# Patient Record
Sex: FEMALE | Race: WHITE | NOT HISPANIC OR LATINO
[De-identification: names, ages, dates, MRNs, and addresses within clinical notes are randomized per-mention and may not be internally consistent; named-entity substitution may affect disease eponyms.]

---

## 2017-02-28 ENCOUNTER — RESULT REVIEW (OUTPATIENT)
Age: 52
End: 2017-02-28

## 2021-05-20 ENCOUNTER — RESULT REVIEW (OUTPATIENT)
Age: 56
End: 2021-05-20

## 2023-07-10 ENCOUNTER — OUTPATIENT (OUTPATIENT)
Dept: OUTPATIENT SERVICES | Facility: HOSPITAL | Age: 58
LOS: 1 days | End: 2023-07-10

## 2023-07-10 VITALS
OXYGEN SATURATION: 98 % | WEIGHT: 113.98 LBS | RESPIRATION RATE: 16 BRPM | HEIGHT: 63.5 IN | DIASTOLIC BLOOD PRESSURE: 94 MMHG | HEART RATE: 65 BPM | TEMPERATURE: 98 F | SYSTOLIC BLOOD PRESSURE: 169 MMHG

## 2023-07-10 DIAGNOSIS — Z98.890 OTHER SPECIFIED POSTPROCEDURAL STATES: Chronic | ICD-10-CM

## 2023-07-10 DIAGNOSIS — N84.0 POLYP OF CORPUS UTERI: ICD-10-CM

## 2023-07-10 LAB
ANION GAP SERPL CALC-SCNC: 14 MMOL/L — SIGNIFICANT CHANGE UP (ref 7–14)
BUN SERPL-MCNC: 12 MG/DL — SIGNIFICANT CHANGE UP (ref 7–23)
CALCIUM SERPL-MCNC: 9.9 MG/DL — SIGNIFICANT CHANGE UP (ref 8.4–10.5)
CHLORIDE SERPL-SCNC: 102 MMOL/L — SIGNIFICANT CHANGE UP (ref 98–107)
CO2 SERPL-SCNC: 25 MMOL/L — SIGNIFICANT CHANGE UP (ref 22–31)
CREAT SERPL-MCNC: 0.71 MG/DL — SIGNIFICANT CHANGE UP (ref 0.5–1.3)
EGFR: 98 ML/MIN/1.73M2 — SIGNIFICANT CHANGE UP
GLUCOSE SERPL-MCNC: 86 MG/DL — SIGNIFICANT CHANGE UP (ref 70–99)
HCT VFR BLD CALC: 41.5 % — SIGNIFICANT CHANGE UP (ref 34.5–45)
HGB BLD-MCNC: 13.4 G/DL — SIGNIFICANT CHANGE UP (ref 11.5–15.5)
MCHC RBC-ENTMCNC: 31.1 PG — SIGNIFICANT CHANGE UP (ref 27–34)
MCHC RBC-ENTMCNC: 32.3 GM/DL — SIGNIFICANT CHANGE UP (ref 32–36)
MCV RBC AUTO: 96.3 FL — SIGNIFICANT CHANGE UP (ref 80–100)
NRBC # BLD: 0 /100 WBCS — SIGNIFICANT CHANGE UP (ref 0–0)
NRBC # FLD: 0 K/UL — SIGNIFICANT CHANGE UP (ref 0–0)
PLATELET # BLD AUTO: 260 K/UL — SIGNIFICANT CHANGE UP (ref 150–400)
POTASSIUM SERPL-MCNC: 4.6 MMOL/L — SIGNIFICANT CHANGE UP (ref 3.5–5.3)
POTASSIUM SERPL-SCNC: 4.6 MMOL/L — SIGNIFICANT CHANGE UP (ref 3.5–5.3)
RBC # BLD: 4.31 M/UL — SIGNIFICANT CHANGE UP (ref 3.8–5.2)
RBC # FLD: 13 % — SIGNIFICANT CHANGE UP (ref 10.3–14.5)
SODIUM SERPL-SCNC: 141 MMOL/L — SIGNIFICANT CHANGE UP (ref 135–145)
WBC # BLD: 4.11 K/UL — SIGNIFICANT CHANGE UP (ref 3.8–10.5)
WBC # FLD AUTO: 4.11 K/UL — SIGNIFICANT CHANGE UP (ref 3.8–10.5)

## 2023-07-10 RX ORDER — SODIUM CHLORIDE 9 MG/ML
1000 INJECTION, SOLUTION INTRAVENOUS
Refills: 0 | Status: DISCONTINUED | OUTPATIENT
Start: 2023-07-20 | End: 2023-08-03

## 2023-07-10 NOTE — H&P PST ADULT - CARDIOVASCULAR
Wrong provider, it was prescribed by Dr Khan   details… regular rate and rhythm/S1 S2 present/no gallops/no rub/no murmur

## 2023-07-10 NOTE — H&P PST ADULT - PROBLEM SELECTOR PLAN 1
This is a 57 y/o female who is scheduled for D&C, hysteroscopy with symphion on 7/20/23.  * Given preop instructions

## 2023-07-10 NOTE — H&P PST ADULT - HISTORY OF PRESENT ILLNESS
This is a 59 y/o female who presents with postmenopausal bleeding. Subsequent sonogram revealed a polyp. Scheduled for D&C, hysteroscopy with symphion.

## 2023-07-19 ENCOUNTER — TRANSCRIPTION ENCOUNTER (OUTPATIENT)
Age: 58
End: 2023-07-19

## 2023-07-20 ENCOUNTER — OUTPATIENT (OUTPATIENT)
Dept: OUTPATIENT SERVICES | Facility: HOSPITAL | Age: 58
LOS: 1 days | Discharge: HOME | End: 2023-07-20
Payer: COMMERCIAL

## 2023-07-20 ENCOUNTER — TRANSCRIPTION ENCOUNTER (OUTPATIENT)
Age: 58
End: 2023-07-20

## 2023-07-20 VITALS
OXYGEN SATURATION: 100 % | HEART RATE: 74 BPM | TEMPERATURE: 98 F | HEIGHT: 63.5 IN | DIASTOLIC BLOOD PRESSURE: 96 MMHG | RESPIRATION RATE: 14 BRPM | SYSTOLIC BLOOD PRESSURE: 160 MMHG | WEIGHT: 113.98 LBS

## 2023-07-20 VITALS
SYSTOLIC BLOOD PRESSURE: 135 MMHG | HEART RATE: 61 BPM | OXYGEN SATURATION: 97 % | DIASTOLIC BLOOD PRESSURE: 91 MMHG | RESPIRATION RATE: 15 BRPM

## 2023-07-20 DIAGNOSIS — Z98.890 OTHER SPECIFIED POSTPROCEDURAL STATES: Chronic | ICD-10-CM

## 2023-07-20 DIAGNOSIS — N84.0 POLYP OF CORPUS UTERI: ICD-10-CM

## 2023-07-20 PROCEDURE — 88360 TUMOR IMMUNOHISTOCHEM/MANUAL: CPT | Mod: 26

## 2023-07-20 PROCEDURE — 88342 IMHCHEM/IMCYTCHM 1ST ANTB: CPT | Mod: 26,59

## 2023-07-20 PROCEDURE — 88305 TISSUE EXAM BY PATHOLOGIST: CPT | Mod: 26

## 2023-07-20 NOTE — ASU DISCHARGE PLAN (ADULT/PEDIATRIC) - NS MD DC FALL RISK RISK
For information on Fall & Injury Prevention, visit: [email or web address] OR  [email or web address] OR  https://www.cdc.gov/steadi/patient.html

## 2023-07-20 NOTE — ASU DISCHARGE PLAN (ADULT/PEDIATRIC) - CARE PROVIDER_API CALL
Kohanim, Behnam  Obstetrics and Gynecology  [address], [address]  [place] [address]  Phone: [phone]  Fax: [phone]  Follow Up Time: 2 weeks

## 2023-07-20 NOTE — ASU DISCHARGE PLAN (ADULT/PEDIATRIC) - NURSING INSTRUCTIONS
You received IV Tylenol for pain management at 11:15AM. Please DO NOT take any Tylenol (Acetaminophen) containing products, such as Vicodin, Percocet, Excedrin, and cold medications for the next 6 hours (until 5:15PM). DO NOT TAKE MORE THAN 4000 MG OF TYLENOL in a 24 hour period. You received IV Toradol for pain management at 11:30AM. Please DO NOT take Motrin/Ibuprofen/Advil/Aleve/NSAIDs (Non-Steroidal Anti-Inflammatory Drugs) for the next 6 hours (until 5:30PM).

## 2023-07-20 NOTE — ASU DISCHARGE PLAN (ADULT/PEDIATRIC) - CALL YOUR DOCTOR IF YOU HAVE ANY OF THE FOLLOWING:
Bleeding that does not stop/Pain not relieved by Medications/Fever greater than (need to indicate Fahrenheit or Celsius)
none

## 2023-07-31 LAB — SURGICAL PATHOLOGY STUDY: SIGNIFICANT CHANGE UP

## 2023-08-03 PROBLEM — N84.0 POLYP OF CORPUS UTERI: Chronic | Status: ACTIVE | Noted: 2023-07-10

## 2023-08-10 ENCOUNTER — NON-APPOINTMENT (OUTPATIENT)
Age: 58
End: 2023-08-10

## 2023-08-28 ENCOUNTER — NON-APPOINTMENT (OUTPATIENT)
Age: 58
End: 2023-08-28

## 2023-08-28 ENCOUNTER — APPOINTMENT (OUTPATIENT)
Dept: GYNECOLOGIC ONCOLOGY | Facility: CLINIC | Age: 58
End: 2023-08-28
Payer: COMMERCIAL

## 2023-08-28 VITALS
BODY MASS INDEX: 20.73 KG/M2 | HEART RATE: 64 BPM | HEIGHT: 63 IN | DIASTOLIC BLOOD PRESSURE: 100 MMHG | WEIGHT: 117 LBS | SYSTOLIC BLOOD PRESSURE: 158 MMHG

## 2023-08-28 DIAGNOSIS — Z80.3 FAMILY HISTORY OF MALIGNANT NEOPLASM OF BREAST: ICD-10-CM

## 2023-08-28 DIAGNOSIS — N85.02 ENDOMETRIAL INTRAEPITHELIAL NEOPLASIA [EIN]: ICD-10-CM

## 2023-08-28 PROCEDURE — 99205 OFFICE O/P NEW HI 60 MIN: CPT

## 2023-08-28 RX ORDER — MULTIVITAMIN
TABLET ORAL
Refills: 0 | Status: ACTIVE | COMMUNITY

## 2023-09-03 PROBLEM — Z80.3 FAMILY HISTORY OF MALIGNANT NEOPLASM OF BREAST: Status: ACTIVE | Noted: 2023-08-28

## 2023-09-03 NOTE — DISCUSSION/SUMMARY
[Reviewed Clinical Lab Test(s)] : Results of clinical tests were reviewed. [Reviewed Radiology Report(s)] : Radiology reports were reviewed. [Reviewed Radiology Film/Image(s)] : Images from radiology studies were reviewed and examined. [FreeTextEntry1] : -I met with the pt. -We discussed the clinical findings and her pathology in detail, particularly in the context of EH and EM Ca.  -Management options were reviewed, including my advice for review of the pathology. We disc the pot role of excision and poss staging. We review the poss use of HT, incl with an IUS.  -Periop and recuperative issues were discussed, as were the possible hazards and risks of surgery, including but not limited to infection, thromboembolic disease and its life-threatening nature, transfusion, and surrounding organ injury (urinary, bowel, vascular, and neural), incision issues. -A diagram was drawn and a copy provided. -We disc the role of further imaging pending the ongoing plan of care.  -Her instructions were reviewed. -All Q/A. -She will set up a f/u disc.  -Path emailed for a disc of results.

## 2023-09-03 NOTE — HISTORY OF PRESENT ILLNESS
[FreeTextEntry1] : Referring GYN - Dr. Robert Chavez PCP - Dr. Cammie Farr   Ms. Nayak is a 57 yo nulliparous postmenopausal female who presents for initial consultation at the request of Dr. Chavez for the management of endometrial atypia. She was seen by GYN on 6/7/23 at which time she reported PMB. TVUS revealed thickened EML and possible polyp. HSG confirmed the presence of an endometrial mass. She underwent D&C, polypectomy on 7/20/23 and final pathology was c/w focal atypia. She was referred here for further management.    PATHOLOGY: EUA, D&C, hysteroscopy, polypectomy, 7/20/23, [facility]   a) endometrial polyp with focal papillary proliferation. Inactive endometrial with focal marked epithelial atypia. Benign endocervical tissue.   IMAGING: HSG on 6/21/23 (outside):  There is a 2.3 cm posterior endometrial mass with internal vascularity. EML 10 mm   TVUS on 6/9/23 (outside): AV uterus 7.9 x 4.1 x 4.7 cm. EML 11 mm. There is a 1.3 x 1.0 x 1.2 cm hypoechoic nodule which appears to be subendometrial posterior body fundal junction.  RTO - 2.1 x 1.2 x 1.5 cm LTO - 2.5 x 1.8 x 2.4 cm. Small follicles are present bilaterally up to 2 cm.  No FF.   PMHx: N/A PSHx: N/A Fam Hx: mother (breast cancer)   HCM: PAP on 5/2021 - NEGATIVE Mammogram: 6/2023 - BIRADS 2 CBE: 6/2023 BK SBE: performs Colonoscopy: 2021 COVID-19 vaccine series completed

## 2023-09-03 NOTE — PHYSICAL EXAM
[Chaperone Present] : A chaperone was present in the examining room during all aspects of the physical examination [Absent] : CVAT: absent [Normal] : Recto-Vaginal Exam: Normal [Fully active, able to carry on all pre-disease performance without restriction] : Status 0 - Fully active, able to carry on all pre-disease performance without restriction [Abnormal] : Examination of extremities for edema and/or varicosities: Abnormal [FreeTextEntry1] : DC [de-identified] : Trace edema [de-identified] : The uterus was nonpalpable [de-identified] : The adnexa were nonpalpable

## 2023-09-06 ENCOUNTER — RESULT REVIEW (OUTPATIENT)
Age: 58
End: 2023-09-06

## 2023-09-06 ENCOUNTER — APPOINTMENT (OUTPATIENT)
Dept: GYNECOLOGIC ONCOLOGY | Facility: CLINIC | Age: 58
End: 2023-09-06

## 2023-09-16 ENCOUNTER — TRANSCRIPTION ENCOUNTER (OUTPATIENT)
Age: 58
End: 2023-09-16

## 2023-09-16 ENCOUNTER — OUTPATIENT (OUTPATIENT)
Dept: OUTPATIENT SERVICES | Facility: HOSPITAL | Age: 58
LOS: 1 days | End: 2023-09-16
Payer: COMMERCIAL

## 2023-09-16 ENCOUNTER — APPOINTMENT (OUTPATIENT)
Dept: CT IMAGING | Facility: CLINIC | Age: 58
End: 2023-09-16
Payer: COMMERCIAL

## 2023-09-16 DIAGNOSIS — D49.59 NEOPLASM OF UNSPECIFIED BEHAVIOR OF OTHER GENITOURINARY ORGAN: ICD-10-CM

## 2023-09-16 DIAGNOSIS — N84.0 POLYP OF CORPUS UTERI: ICD-10-CM

## 2023-09-16 DIAGNOSIS — Z98.890 OTHER SPECIFIED POSTPROCEDURAL STATES: Chronic | ICD-10-CM

## 2023-09-16 DIAGNOSIS — N85.02 ENDOMETRIAL INTRAEPITHELIAL NEOPLASIA [EIN]: ICD-10-CM

## 2023-09-16 PROCEDURE — 74177 CT ABD & PELVIS W/CONTRAST: CPT | Mod: 26

## 2023-09-16 PROCEDURE — 74177 CT ABD & PELVIS W/CONTRAST: CPT

## 2023-09-20 ENCOUNTER — APPOINTMENT (OUTPATIENT)
Dept: GYNECOLOGIC ONCOLOGY | Facility: CLINIC | Age: 58
End: 2023-09-20
Payer: COMMERCIAL

## 2023-09-20 DIAGNOSIS — D49.59 NEOPLASM UNSPECIFIED BEHAVIOR OF OTHER GENITOURINARY ORGAN: ICD-10-CM

## 2023-09-20 PROCEDURE — 99213 OFFICE O/P EST LOW 20 MIN: CPT | Mod: 95

## 2023-10-04 ENCOUNTER — OUTPATIENT (OUTPATIENT)
Dept: OUTPATIENT SERVICES | Facility: HOSPITAL | Age: 58
LOS: 1 days | End: 2023-10-04

## 2023-10-04 VITALS
OXYGEN SATURATION: 100 % | HEART RATE: 59 BPM | SYSTOLIC BLOOD PRESSURE: 163 MMHG | RESPIRATION RATE: 16 BRPM | HEIGHT: 63.5 IN | DIASTOLIC BLOOD PRESSURE: 95 MMHG | WEIGHT: 115.08 LBS | TEMPERATURE: 98 F

## 2023-10-04 DIAGNOSIS — D49.59 NEOPLASM OF UNSPECIFIED BEHAVIOR OF OTHER GENITOURINARY ORGAN: ICD-10-CM

## 2023-10-04 DIAGNOSIS — I10 ESSENTIAL (PRIMARY) HYPERTENSION: ICD-10-CM

## 2023-10-04 DIAGNOSIS — Z98.890 OTHER SPECIFIED POSTPROCEDURAL STATES: Chronic | ICD-10-CM

## 2023-10-04 DIAGNOSIS — N95.0 POSTMENOPAUSAL BLEEDING: ICD-10-CM

## 2023-10-04 RX ORDER — SODIUM CHLORIDE 9 MG/ML
1000 INJECTION, SOLUTION INTRAVENOUS
Refills: 0 | Status: DISCONTINUED | OUTPATIENT
Start: 2023-10-11 | End: 2023-10-25

## 2023-10-04 NOTE — H&P PST ADULT - PROBLEM SELECTOR PLAN 2
Await prearranged medical evaluation with pcp due to elevated BP at PAST office  * Copy of bmp and cbc from July 2023 in chart  * Await ekg from pcp

## 2023-10-04 NOTE — H&P PST ADULT - HISTORY OF PRESENT ILLNESS
This is a 57 y/o female who presents with recent D&C in July which revealed a pathology of "atypical cells." Patient did have some more postmenopausal bleeding. Oncology GYN MD wanted patient to have another D&C. Repeat CT scan performed. Scheduled for D&C, hysteroscopy

## 2023-10-04 NOTE — H&P PST ADULT - ATTENDING COMMENTS
Seen in ASU. She reports no changes. Planned procedure disc. Consent form reviewed. All Q/A, incl instructions. Case d/w Anesth attd.

## 2023-10-04 NOTE — H&P PST ADULT - PROBLEM SELECTOR PLAN 1
This is a 57 y/o female who is scheduled for D&C, hysteroscopy on 10-11-23  * Given preop instructions

## 2023-10-10 ENCOUNTER — TRANSCRIPTION ENCOUNTER (OUTPATIENT)
Age: 58
End: 2023-10-10

## 2023-10-10 NOTE — ASU PATIENT PROFILE, ADULT - FALL HARM RISK - UNIVERSAL INTERVENTIONS
Bed in lowest position, wheels locked, appropriate side rails in place/Call bell, personal items and telephone in reach/Instruct patient to call for assistance before getting out of bed or chair/Non-slip footwear when patient is out of bed/[place] to call system/Physically safe environment - no spills, clutter or unnecessary equipment/Purposeful Proactive Rounding/Room/bathroom lighting operational, light cord in reach

## 2023-10-11 ENCOUNTER — TRANSCRIPTION ENCOUNTER (OUTPATIENT)
Age: 58
End: 2023-10-11

## 2023-10-11 ENCOUNTER — OUTPATIENT (OUTPATIENT)
Dept: OUTPATIENT SERVICES | Facility: HOSPITAL | Age: 58
LOS: 1 days | Discharge: HOME | End: 2023-10-11
Payer: COMMERCIAL

## 2023-10-11 ENCOUNTER — APPOINTMENT (OUTPATIENT)
Dept: GYNECOLOGIC ONCOLOGY | Facility: HOSPITAL | Age: 58
End: 2023-10-11

## 2023-10-11 ENCOUNTER — RESULT REVIEW (OUTPATIENT)
Age: 58
End: 2023-10-11

## 2023-10-11 VITALS
RESPIRATION RATE: 13 BRPM | SYSTOLIC BLOOD PRESSURE: 133 MMHG | DIASTOLIC BLOOD PRESSURE: 84 MMHG | OXYGEN SATURATION: 96 % | HEART RATE: 70 BPM

## 2023-10-11 VITALS
SYSTOLIC BLOOD PRESSURE: 141 MMHG | TEMPERATURE: 98 F | HEART RATE: 71 BPM | HEIGHT: 63 IN | RESPIRATION RATE: 16 BRPM | OXYGEN SATURATION: 100 % | DIASTOLIC BLOOD PRESSURE: 89 MMHG | WEIGHT: 115.08 LBS

## 2023-10-11 DIAGNOSIS — Z98.890 OTHER SPECIFIED POSTPROCEDURAL STATES: Chronic | ICD-10-CM

## 2023-10-11 DIAGNOSIS — D49.59 NEOPLASM OF UNSPECIFIED BEHAVIOR OF OTHER GENITOURINARY ORGAN: ICD-10-CM

## 2023-10-11 LAB — HCG UR QL: NEGATIVE — SIGNIFICANT CHANGE UP

## 2023-10-11 PROCEDURE — 58558 HYSTEROSCOPY BIOPSY: CPT

## 2023-10-11 PROCEDURE — 88305 TISSUE EXAM BY PATHOLOGIST: CPT | Mod: 26

## 2023-10-11 NOTE — ASU DISCHARGE PLAN (ADULT/PEDIATRIC) - NURSING INSTRUCTIONS
You were given intravenous TYLENOL for pain management at  1.30 pm Please DO NOT take any products containing TYLENOL or ACETAMINOPHEN, such as VICODIN, PERCOCET, EXCEDRIN, and any over-the-counter cold medication for the next 6 hours (until  7.30 pm. DO NOT TAKE MORE THAN 3000 MG OF TYLENOL in a 24 hour period. You were given intravenous TORADOL for pain management at  2 pm  Please DO NOT take ibuprofen containing products such as MOTRIN or ADVIL, or any other NSAIDs (Non-Steroidal Anti-Inflammatory Drugs) such as ALEVE for the next 6 hours (until  8 pm

## 2023-10-11 NOTE — ASU DISCHARGE PLAN (ADULT/PEDIATRIC) - MEDICATION INSTRUCTIONS
Ibuprofen 600mg every 6 hours as needed for pain, Acetaminophen 500mg every 6 hours as needed for pain

## 2023-10-11 NOTE — ASU PREOP CHECKLIST - STERILIZATION AFFIRMATION
Date of Surgery Update:  Jay Jay Burnette was seen and examined on the day of surgery prior to the procedure. There were no significant clinical changes since the completion of the History and Physical.    Exam today prior to surgery showed no acute cardiac findings, no respiratory difficulty, and no abdominal complaints or pain. This patient is a candidate for TXA. Documentation of Medical Necessity:    Symptoms: pain with activity and at rest, antalgia, interferes with ADLs    Conservative Treatment: activity modification, multiple medications, injection    Physical Findings: painful AROM/PROM, antalgia on ambulation, no trochanteric pain    Imaging: significant OA, sclerosis and osteophytes    Indications:   Failure of conservative treatments with daily pain and functional limitations. Appropriate imaging demonstrating significant disease. Appropriate physical findings consistent with significant degenerative joint disease. All pertinent risks, benefits, and alternatives to operative management including continued conservative care were explained at length. The patient has elected to proceed with appropriately indicated and medically necessary total joint arthroplasty. They understand no guarantees can be given about the outcome.     Signed By: CHERIE Vinson     January 20, 2020 10:30 AM n/a

## 2023-10-11 NOTE — ASU DISCHARGE PLAN (ADULT/PEDIATRIC) - NS MD DC FALL RISK RISK
For information on Fall & Injury Prevention, visit: [email or web address] OR  [email or web address] OR  https://www.cdc.gov/steadi/patient.html

## 2023-10-11 NOTE — ASU DISCHARGE PLAN (ADULT/PEDIATRIC) - PROVIDER TOKENS
Patient wants to know if Dr. Vianney Douglas still want her to have the ultrasound and  Nuclear Medicine on 5/11/22, please call.
Spoke with patient, informed her not to cancel nuclear med test. Will call PCP office & obtain lab values. Express understanding.
PROVIDER:[TOKEN:[[id number]],FOLLOWUP:[2 weeks]]

## 2023-10-11 NOTE — ASU DISCHARGE PLAN (ADULT/PEDIATRIC) - CARE PROVIDER_API CALL
Bear Lancaster  Gynecologic Oncology  [address] [place] [id number]  Phone: [phone]  Fax: [phone]  Follow Up Time: 2 weeks

## 2023-10-11 NOTE — BRIEF OPERATIVE NOTE - OPERATION/FINDINGS
Nl v, v, cx, pm. Ut and adnexae not palpably enlarged.   Ut sounded to 6+ cm. H/S with Nl EC canal. EM atrophic appearing with nodularity on anterior surface near top of cavity - biopsied with forceps under direct vision (x5); curettage then performed along with use of a pipelle x 2.   Op site hemostatic.   CC. Nl v with ~3cm right gluteal apparent lipoma, v, cx, pm. Ut and adnexae not palpably enlarged.   Ut sounded to 6+ cm. H/S with Nl EC canal. EM atrophic appearing with nodularity on anterior surface near top of cavity - biopsied with forceps under direct vision (x5); curettage then performed along with use of a pipelle x 2.   Op site hemostatic.   CC.

## 2023-10-12 ENCOUNTER — NON-APPOINTMENT (OUTPATIENT)
Age: 58
End: 2023-10-12

## 2023-10-16 ENCOUNTER — NON-APPOINTMENT (OUTPATIENT)
Age: 58
End: 2023-10-16

## 2023-10-19 LAB
SURGICAL PATHOLOGY STUDY: SIGNIFICANT CHANGE UP
SURGICAL PATHOLOGY STUDY: SIGNIFICANT CHANGE UP

## 2023-10-23 PROBLEM — N84.0 ENDOMETRIAL POLYP: Status: ACTIVE | Noted: 2023-08-21

## 2023-10-23 PROBLEM — R93.89 THICKENED ENDOMETRIUM: Status: ACTIVE | Noted: 2023-08-21

## 2023-10-23 PROBLEM — N95.0 POSTMENOPAUSAL BLEEDING: Status: ACTIVE | Noted: 2023-08-21

## 2023-10-30 ENCOUNTER — APPOINTMENT (OUTPATIENT)
Dept: GYNECOLOGIC ONCOLOGY | Facility: CLINIC | Age: 58
End: 2023-10-30
Payer: COMMERCIAL

## 2023-10-30 VITALS
TEMPERATURE: 97.7 F | HEART RATE: 66 BPM | SYSTOLIC BLOOD PRESSURE: 138 MMHG | DIASTOLIC BLOOD PRESSURE: 79 MMHG | WEIGHT: 117 LBS | BODY MASS INDEX: 20.73 KG/M2

## 2023-10-30 DIAGNOSIS — N95.0 POSTMENOPAUSAL BLEEDING: ICD-10-CM

## 2023-10-30 DIAGNOSIS — R93.89 ABNORMAL FINDINGS ON DIAGNOSTIC IMAGING OF OTHER SPECIFIED BODY STRUCTURES: ICD-10-CM

## 2023-10-30 DIAGNOSIS — N84.0 POLYP OF CORPUS UTERI: ICD-10-CM

## 2023-10-30 PROCEDURE — 99212 OFFICE O/P EST SF 10 MIN: CPT

## 2024-10-24 NOTE — ASU DISCHARGE PLAN (ADULT/PEDIATRIC) - NPI NUMBER (FOR SYSADMIN USE ONLY) :
Call to patient     Patient states that she had a biopsy for her breast and a marker was placed     2 days after this event patient broke out into a rash ans had swelling in her breast , under the armpit and going down her arm     Patient states that when you received the biopsy , patient had an IV that patient thinks was iodine . She is unsure but is worried about a scan with Iodine          [[id number]]

## (undated) DEVICE — DRSG PAD SANITARY OB

## (undated) DEVICE — SYMPHION FLUID MANAGEMENT DEVICE

## (undated) DEVICE — BASIN SET DOUBLE

## (undated) DEVICE — VISITEC 4X4

## (undated) DEVICE — GLV 7.5 PROTEXIS (BLUE)

## (undated) DEVICE — GOWN LG

## (undated) DEVICE — WARMING BLANKET UPPER ADULT

## (undated) DEVICE — POSITIONER STRAP ARMBOARD VELCRO TS-30

## (undated) DEVICE — VENODYNE/SCD SLEEVE CALF MEDIUM

## (undated) DEVICE — LABELS BLANK W PEN

## (undated) DEVICE — TUBING SUCTION NONCONDUCTIVE 6MM X 12FT

## (undated) DEVICE — PRESSURE INFUSOR BAG 1000ML

## (undated) DEVICE — PACK D&C

## (undated) DEVICE — GLV 7.5 PROTEXIS (WHITE)

## (undated) DEVICE — DRAPE LIGHT HANDLE COVER (GREEN)

## (undated) DEVICE — ELCTR REM POLYHESIVE ADULT PT RETURN 15FT

## (undated) DEVICE — DRAPE IRRIGATION POUCH 19X23"

## (undated) DEVICE — SOL IRR POUR NS 0.9% 1000ML

## (undated) DEVICE — DRAPE TOWEL BLUE 17" X 24"

## (undated) DEVICE — SYMPHION 3.6MM RESECTING DEVICE

## (undated) DEVICE — INJ BAGAS OSMITROL 5% IV 1000ML

## (undated) DEVICE — DRSG TELFA 3 X 8

## (undated) DEVICE — SOL IRR BAG NS 0.9% 3000ML

## (undated) DEVICE — PROTECTOR HEEL / ELBOW FLUFFY

## (undated) DEVICE — GLV 7 PROTEXIS (CREAM) MICRO

## (undated) DEVICE — DRAPE UNDER BUTTOCKS W SCREEN

## (undated) DEVICE — PREP BETADINE SPONGE STICKS

## (undated) DEVICE — CABLE DAC ACTIVE CORD

## (undated) DEVICE — TUBING IRR SET FOR CYSTOSCOPY 77"

## (undated) DEVICE — SOL IRR POUR H2O 500ML

## (undated) DEVICE — GOWN XL